# Patient Record
Sex: MALE | Race: WHITE | Employment: FULL TIME | URBAN - NONMETROPOLITAN AREA
[De-identification: names, ages, dates, MRNs, and addresses within clinical notes are randomized per-mention and may not be internally consistent; named-entity substitution may affect disease eponyms.]

---

## 2020-09-21 PROBLEM — E66.813 CLASS 3 SEVERE OBESITY IN ADULT (HCC): Status: ACTIVE | Noted: 2020-09-21

## 2024-04-04 ENCOUNTER — TRANSCRIBE ORDERS (OUTPATIENT)
Dept: ADMINISTRATIVE | Age: 39
End: 2024-04-04

## 2024-04-04 DIAGNOSIS — S89.90XA INJURY OF CALF: ICD-10-CM

## 2024-04-04 DIAGNOSIS — M79.661 RIGHT CALF PAIN: Primary | ICD-10-CM

## 2024-04-08 ENCOUNTER — HOSPITAL ENCOUNTER (OUTPATIENT)
Dept: MRI IMAGING | Age: 39
Discharge: HOME | End: 2024-04-10
Attending: FAMILY MEDICINE
Payer: COMMERCIAL

## 2024-04-08 DIAGNOSIS — S89.90XA INJURY OF CALF: ICD-10-CM

## 2024-04-08 DIAGNOSIS — M79.661 RIGHT CALF PAIN: ICD-10-CM

## 2024-04-08 PROCEDURE — 73718 MRI LOWER EXTREMITY W/O DYE: CPT

## 2025-05-10 ENCOUNTER — HOSPITAL ENCOUNTER (EMERGENCY)
Age: 40
Discharge: HOME | End: 2025-05-10
Attending: EMERGENCY MEDICINE
Payer: COMMERCIAL

## 2025-05-10 ENCOUNTER — APPOINTMENT (OUTPATIENT)
Dept: GENERAL RADIOLOGY | Age: 40
End: 2025-05-10
Payer: COMMERCIAL

## 2025-05-10 VITALS
WEIGHT: 315 LBS | TEMPERATURE: 98.8 F | DIASTOLIC BLOOD PRESSURE: 77 MMHG | BODY MASS INDEX: 41.75 KG/M2 | HEART RATE: 99 BPM | OXYGEN SATURATION: 96 % | RESPIRATION RATE: 18 BRPM | SYSTOLIC BLOOD PRESSURE: 154 MMHG | HEIGHT: 73 IN

## 2025-05-10 DIAGNOSIS — J06.9 VIRAL URI WITH COUGH: Primary | ICD-10-CM

## 2025-05-10 LAB
FLUAV RNA RESP QL NAA+PROBE: NOT DETECTED
FLUBV RNA RESP QL NAA+PROBE: NOT DETECTED
SARS-COV-2 RNA RESP QL NAA+PROBE: NOT DETECTED
SOURCE: NORMAL
SPECIMEN DESCRIPTION: NORMAL

## 2025-05-10 PROCEDURE — 71046 X-RAY EXAM CHEST 2 VIEWS: CPT

## 2025-05-10 PROCEDURE — 87636 SARSCOV2 & INF A&B AMP PRB: CPT

## 2025-05-10 PROCEDURE — 99284 EMERGENCY DEPT VISIT MOD MDM: CPT

## 2025-05-10 PROCEDURE — 6370000000 HC RX 637 (ALT 250 FOR IP): Performed by: EMERGENCY MEDICINE

## 2025-05-10 RX ORDER — PREDNISONE 20 MG/1
20 TABLET ORAL DAILY
Qty: 5 TABLET | Refills: 0 | Status: SHIPPED | OUTPATIENT
Start: 2025-05-10 | End: 2025-05-15

## 2025-05-10 RX ORDER — BENZONATATE 200 MG/1
200 CAPSULE ORAL ONCE
Status: COMPLETED | OUTPATIENT
Start: 2025-05-10 | End: 2025-05-10

## 2025-05-10 RX ADMIN — BENZONATATE 200 MG: 200 CAPSULE ORAL at 09:57

## 2025-05-10 ASSESSMENT — LIFESTYLE VARIABLES
HOW OFTEN DO YOU HAVE A DRINK CONTAINING ALCOHOL: NEVER
HOW MANY STANDARD DRINKS CONTAINING ALCOHOL DO YOU HAVE ON A TYPICAL DAY: PATIENT DOES NOT DRINK

## 2025-05-10 ASSESSMENT — ENCOUNTER SYMPTOMS
SORE THROAT: 1
SHORTNESS OF BREATH: 1
NAUSEA: 0
DIARRHEA: 1
ABDOMINAL PAIN: 0
COUGH: 1
VOMITING: 0

## 2025-05-10 ASSESSMENT — PAIN - FUNCTIONAL ASSESSMENT: PAIN_FUNCTIONAL_ASSESSMENT: NONE - DENIES PAIN

## 2025-05-10 NOTE — ED PROVIDER NOTES
EMERGENCY DEPARTMENT ENCOUNTER    Pt Name: Jerel Jain  MRN: [id number]  Birthdate 1985  Date of evaluation: 5/10/25  CHIEF COMPLAINT       Chief Complaint   Patient presents with    Cold Symptoms     HISTORY OF PRESENT ILLNESS   Patient is presenting for cold-like symptoms.  His wife was recently diagnosed with influenza.  Over the last 2 days he has developed a cough as well as rhinorrhea and nasal congestion.  He has subjective fever and chills.  He has body aches.  He says it is making him feel short of breath.  He has had loose stools.  He denies chest pain or abdominal pain.    The history is provided by the patient.           REVIEW OF SYSTEMS     Review of Systems   Constitutional:  Positive for chills and fever.   HENT:  Positive for congestion and sore throat.    Eyes:  Negative for visual disturbance.   Respiratory:  Positive for cough and shortness of breath.    Cardiovascular:  Negative for chest pain.   Gastrointestinal:  Positive for diarrhea (loose stools). Negative for abdominal pain, nausea and vomiting.   Musculoskeletal:  Positive for myalgias.   Neurological:  Negative for headaches.     PASTMEDICAL HISTORY     Past Medical History:   Diagnosis Date    Hypertension     Morbid obesity (HCC)     UTI (urinary tract infection)      Past Problem List  Patient Active Problem List   Diagnosis Code    Acute infective cystitis N30.00    Fatty liver K76.0    Class 3 severe obesity in adult ([facility]) E66.813    Nodule of right lung R91.1    Depression F32.A    Pneumonia due to COVID-19 virus U07.1, J12.82    Essential hypertension I10    Sepsis ([facility]) A41.9     SURGICAL HISTORY     History reviewed. No pertinent surgical history.  CURRENT MEDICATIONS       Previous Medications    ACETAMINOPHEN (TYLENOL) 500 MG TABLET    Take 2 tablets by mouth every 6 hours as needed for Pain    BUSPIRONE (BUSPAR) 10 MG TABLET    Take 1 tablet by mouth 3 times daily    CITALOPRAM (CELEXA) 20 MG TABLET    Take 20 mg by